# Patient Record
Sex: MALE | Race: OTHER | Employment: UNEMPLOYED
[De-identification: names, ages, dates, MRNs, and addresses within clinical notes are randomized per-mention and may not be internally consistent; named-entity substitution may affect disease eponyms.]

---

## 2023-01-09 ENCOUNTER — OFFICE VISIT (OUTPATIENT)
Dept: FAMILY MEDICINE CLINIC | Facility: CLINIC | Age: 28
End: 2023-01-09
Payer: COMMERCIAL

## 2023-01-09 ENCOUNTER — HOSPITAL ENCOUNTER (OUTPATIENT)
Dept: GENERAL RADIOLOGY | Age: 28
Discharge: HOME | End: 2023-01-12

## 2023-01-09 VITALS
SYSTOLIC BLOOD PRESSURE: 116 MMHG | DIASTOLIC BLOOD PRESSURE: 79 MMHG | OXYGEN SATURATION: 96 % | BODY MASS INDEX: 25.73 KG/M2 | HEART RATE: 104 BPM | WEIGHT: 190 LBS | TEMPERATURE: 96.6 F | HEIGHT: 72 IN | RESPIRATION RATE: 20 BRPM

## 2023-01-09 DIAGNOSIS — M54.16 CHRONIC RIGHT-SIDED LUMBAR RADICULOPATHY: ICD-10-CM

## 2023-01-09 DIAGNOSIS — M54.16 CHRONIC RIGHT-SIDED LUMBAR RADICULOPATHY: Primary | ICD-10-CM

## 2023-01-09 PROCEDURE — 99204 OFFICE O/P NEW MOD 45 MIN: CPT | Performed by: FAMILY MEDICINE

## 2023-01-09 RX ORDER — CYCLOBENZAPRINE HCL 10 MG
10 TABLET ORAL NIGHTLY PRN
Qty: 10 TABLET | Refills: 0 | Status: SHIPPED | OUTPATIENT
Start: 2023-01-09 | End: 2023-01-19

## 2023-01-09 RX ORDER — CELECOXIB 200 MG/1
200 CAPSULE ORAL 2 TIMES DAILY
Qty: 60 CAPSULE | Refills: 0 | Status: SHIPPED | OUTPATIENT
Start: 2023-01-09

## 2023-01-09 ASSESSMENT — ENCOUNTER SYMPTOMS
DIARRHEA: 0
NAUSEA: 0
BACK PAIN: 1
CONSTIPATION: 0
ABDOMINAL PAIN: 0
SHORTNESS OF BREATH: 0

## 2023-01-09 ASSESSMENT — PATIENT HEALTH QUESTIONNAIRE - PHQ9
SUM OF ALL RESPONSES TO PHQ QUESTIONS 1-9: 0
2. FEELING DOWN, DEPRESSED OR HOPELESS: 0
SUM OF ALL RESPONSES TO PHQ QUESTIONS 1-9: 0
1. LITTLE INTEREST OR PLEASURE IN DOING THINGS: 0
SUM OF ALL RESPONSES TO PHQ QUESTIONS 1-9: 0
SUM OF ALL RESPONSES TO PHQ9 QUESTIONS 1 & 2: 0
SUM OF ALL RESPONSES TO PHQ QUESTIONS 1-9: 0

## 2023-01-09 NOTE — PROGRESS NOTES
Hermes Self (:  1995) is a 33 y.o. male,Established patient, here for evaluation of the following chief complaint(s):  New Patient (Back pain, left lower side and goes into the left leg. For the past 3 months. Has seen other doctors in the past for this and has been given medication with some help, but didn't make the pain go away.)         ASSESSMENT/PLAN:  1. Chronic right-sided lumbar radiculopathy  -     XR LUMBAR SPINE (MIN 4 VIEWS); Future  -     cyclobenzaprine (FLEXERIL) 10 MG tablet; Take 1 tablet by mouth nightly as needed for Muscle spasms, Disp-10 tablet, R-0Normal  -     celecoxib (CELEBREX) 200 MG capsule; Take 1 capsule by mouth 2 times daily, Disp-60 capsule, R-0Normal  Will start with xray, nsaids, and muscle relaxers. Likely needs MRI and would like to see [facility] if needed. No follow-ups on file. Subjective   SUBJECTIVE/OBJECTIVE:  HPI  Chief Complaint   Patient presents with    New Patient     Back pain, left lower side and goes into the left leg. For the past 3 months. Has seen other doctors in the past for this and has been given medication with some help, but didn't make the pain go away. Was living in [place] doing his post graduate degree in [employer]. He woke up with pain on movement around . Pain is down the lateral left leg which is described as cold/burning from his lateral hip to lateral foot. Says he hasn't felt his lateral left foot in 3 months. Pain is worse with prolonged standing or sitting. He denies any specific precipitating injury, but was very active with boxing, rugby, hiking, etc prior to his back pain. [facility] w/ little relief. Has been to  twice. Once was given flexeril to help him get through the 18 hour flight home to [facility]. This did provide some relief and dulled the pain.   More recently, he was seen at [facility] again and was given a 5 day course of steroids which also provided some relief. He took his last dose of flexeril today. History reviewed. No pertinent past medical history. No past surgical history on file. Only pertinent hx is that he had a similar episode of pain about 10 years ago which self resolved. Review of Systems   Constitutional:  Negative for fatigue and unexpected weight change. Eyes:  Negative for visual disturbance. Respiratory:  Negative for shortness of breath. Cardiovascular:  Negative for chest pain, palpitations and leg swelling. Gastrointestinal:  Negative for abdominal pain, constipation, diarrhea and nausea. Genitourinary:  Negative for difficulty urinating, dysuria and frequency. Musculoskeletal:  Positive for back pain. Skin:  Negative for rash. Neurological:  Negative for tremors, weakness and numbness. Psychiatric/Behavioral:  Negative for dysphoric mood. Objective   Physical Exam  Vitals reviewed. Constitutional:       Appearance: Normal appearance. HENT:      Head: Normocephalic and atraumatic. Nose: Nose normal.      Mouth/Throat:      Mouth: Mucous membranes are moist.      Pharynx: Oropharynx is clear. Eyes:      Extraocular Movements: Extraocular movements intact. Conjunctiva/sclera: Conjunctivae normal.      Pupils: Pupils are equal, round, and reactive to light. Cardiovascular:      Rate and Rhythm: Normal rate and regular rhythm. Pulses: Normal pulses. Heart sounds: Normal heart sounds. Pulmonary:      Effort: Pulmonary effort is normal.      Breath sounds: Normal breath sounds. Abdominal:      General: Abdomen is flat. Palpations: Abdomen is soft. Musculoskeletal:         General: Normal range of motion. Lumbar back: No swelling, edema, deformity, spasms, tenderness or bony tenderness. Normal range of motion.  Negative right straight leg raise test and negative left straight leg raise test.      Comments: Not able to sit for long periods during the history portion of our exam 2/2 pain. Skin:     General: Skin is warm and dry. Neurological:      General: No focal deficit present. Mental Status: He is alert. Sensory: Sensory deficit (over L5 distribution) present. Motor: Motor function is intact. No weakness or atrophy. Gait: Gait abnormal (2/2 pain). Deep Tendon Reflexes:      Reflex Scores:       Patellar reflexes are 2+ on the right side and 1+ on the left side. /79 (Site: Right Upper Arm, Position: Sitting, Cuff Size: Large Adult)   Pulse (!) 104   Temp (!) 96.6 °F (35.9 °C)   Resp 20   Ht 6' (1.829 m)   Wt 190 lb (86.2 kg)   SpO2 96%   BMI 25.77 kg/m²          An electronic signature was used to authenticate this note.     --Cade Sheehan MD

## 2023-01-10 ENCOUNTER — TELEPHONE (OUTPATIENT)
Dept: FAMILY MEDICINE CLINIC | Facility: CLINIC | Age: 28
End: 2023-01-10

## 2023-01-10 NOTE — TELEPHONE ENCOUNTER
I called the patient but got his voice mail box. I left a message letting him know about the results and notified him that he would be getting a call to set up the MRI. I asked him to please call back if there are any questions.

## 2023-01-10 NOTE — TELEPHONE ENCOUNTER
----- Message from Benitez Aguilar MD sent at 1/10/2023  8:55 AM EST -----  Xray was normal.  I have ordered an mri.

## 2023-01-12 ENCOUNTER — TELEPHONE (OUTPATIENT)
Dept: FAMILY MEDICINE CLINIC | Facility: CLINIC | Age: 28
End: 2023-01-12

## 2023-01-12 RX ORDER — GABAPENTIN 300 MG/1
CAPSULE ORAL
COMMUNITY
Start: 2023-01-11

## 2023-01-12 NOTE — TELEPHONE ENCOUNTER
Patient called and left a message stating that he wanted to give an update to the doctor. He is schedule for a MRI on 1-16-23, and stated that he is schedule to follow up with Dr. Bonnie Stephensoner on 1-18-23 to talk about the MRI. He has been prescribed Gabapentin as well.